# Patient Record
Sex: MALE | Race: WHITE
[De-identification: names, ages, dates, MRNs, and addresses within clinical notes are randomized per-mention and may not be internally consistent; named-entity substitution may affect disease eponyms.]

---

## 2017-09-29 ENCOUNTER — HOSPITAL ENCOUNTER (OUTPATIENT)
Dept: HOSPITAL 92 - ERS | Age: 72
Setting detail: OBSERVATION
LOS: 2 days | Discharge: HOME | End: 2017-10-01
Attending: INTERNAL MEDICINE | Admitting: INTERNAL MEDICINE
Payer: MEDICARE

## 2017-09-29 VITALS — BODY MASS INDEX: 37.5 KG/M2

## 2017-09-29 DIAGNOSIS — I25.10: ICD-10-CM

## 2017-09-29 DIAGNOSIS — Z90.49: ICD-10-CM

## 2017-09-29 DIAGNOSIS — E66.01: ICD-10-CM

## 2017-09-29 DIAGNOSIS — Z79.01: ICD-10-CM

## 2017-09-29 DIAGNOSIS — D50.0: ICD-10-CM

## 2017-09-29 DIAGNOSIS — I48.2: ICD-10-CM

## 2017-09-29 DIAGNOSIS — Z82.3: ICD-10-CM

## 2017-09-29 DIAGNOSIS — Z95.0: ICD-10-CM

## 2017-09-29 DIAGNOSIS — J45.909: ICD-10-CM

## 2017-09-29 DIAGNOSIS — Z87.891: ICD-10-CM

## 2017-09-29 DIAGNOSIS — N18.3: ICD-10-CM

## 2017-09-29 DIAGNOSIS — K63.3: ICD-10-CM

## 2017-09-29 DIAGNOSIS — Z88.0: ICD-10-CM

## 2017-09-29 DIAGNOSIS — J44.9: ICD-10-CM

## 2017-09-29 DIAGNOSIS — E78.5: ICD-10-CM

## 2017-09-29 DIAGNOSIS — Z79.899: ICD-10-CM

## 2017-09-29 DIAGNOSIS — I12.9: ICD-10-CM

## 2017-09-29 DIAGNOSIS — E11.22: ICD-10-CM

## 2017-09-29 DIAGNOSIS — M10.9: ICD-10-CM

## 2017-09-29 DIAGNOSIS — K92.2: Primary | ICD-10-CM

## 2017-09-29 DIAGNOSIS — Z80.8: ICD-10-CM

## 2017-09-29 LAB
ALP SERPL-CCNC: 29 U/L (ref 40–150)
ALT SERPL W P-5'-P-CCNC: 19 U/L (ref 8–55)
ANION GAP SERPL CALC-SCNC: 12 MMOL/L (ref 10–20)
APTT PPP: 37.8 SEC (ref 22.9–36.1)
AST SERPL-CCNC: 18 U/L (ref 5–34)
BASOPHILS # BLD AUTO: 0 THOU/UL (ref 0–0.2)
BASOPHILS NFR BLD AUTO: 0.5 % (ref 0–1)
BILIRUB SERPL-MCNC: 0.4 MG/DL (ref 0.2–1.2)
BUN SERPL-MCNC: 37 MG/DL (ref 8.4–25.7)
CALCIUM SERPL-MCNC: 9.3 MG/DL (ref 7.8–10.44)
CHLORIDE SERPL-SCNC: 104 MMOL/L (ref 98–107)
CO2 SERPL-SCNC: 32 MMOL/L (ref 23–31)
CREAT CL PREDICTED SERPL C-G-VRATE: 0 ML/MIN (ref 70–130)
EOSINOPHIL # BLD AUTO: 0.3 THOU/UL (ref 0–0.7)
EOSINOPHIL NFR BLD AUTO: 4.4 % (ref 0–10)
GLOBULIN SER CALC-MCNC: 2.9 G/DL (ref 2.4–3.5)
HCT VFR BLD CALC: 36.4 % (ref 42–52)
HCT VFR BLD CALC: 41.9 % (ref 42–52)
LYMPHOCYTES # BLD: 1.2 THOU/UL (ref 1.2–3.4)
LYMPHOCYTES NFR BLD AUTO: 15.5 % (ref 21–51)
MONOCYTES # BLD AUTO: 0.5 THOU/UL (ref 0.11–0.59)
MONOCYTES NFR BLD AUTO: 6.1 % (ref 0–10)
NEUTROPHILS # BLD AUTO: 5.6 THOU/UL (ref 1.4–6.5)
PROTHROMBIN TIME: 22 SEC (ref 12–14.7)
RBC # BLD AUTO: 4.52 MILL/UL (ref 4.7–6.1)
WBC # BLD AUTO: 7.6 THOU/UL (ref 4.8–10.8)

## 2017-09-29 PROCEDURE — 36430 TRANSFUSION BLD/BLD COMPNT: CPT

## 2017-09-29 PROCEDURE — 82962 GLUCOSE BLOOD TEST: CPT

## 2017-09-29 PROCEDURE — P9059 PLASMA, FRZ BETWEEN 8-24HOUR: HCPCS

## 2017-09-29 PROCEDURE — 85610 PROTHROMBIN TIME: CPT

## 2017-09-29 PROCEDURE — 36415 COLL VENOUS BLD VENIPUNCTURE: CPT

## 2017-09-29 PROCEDURE — 85049 AUTOMATED PLATELET COUNT: CPT

## 2017-09-29 PROCEDURE — G0378 HOSPITAL OBSERVATION PER HR: HCPCS

## 2017-09-29 PROCEDURE — 80053 COMPREHEN METABOLIC PANEL: CPT

## 2017-09-29 PROCEDURE — 86900 BLOOD TYPING SEROLOGIC ABO: CPT

## 2017-09-29 PROCEDURE — 80048 BASIC METABOLIC PNL TOTAL CA: CPT

## 2017-09-29 PROCEDURE — 85018 HEMOGLOBIN: CPT

## 2017-09-29 PROCEDURE — 85025 COMPLETE CBC W/AUTO DIFF WBC: CPT

## 2017-09-29 PROCEDURE — 99285 EMERGENCY DEPT VISIT HI MDM: CPT

## 2017-09-29 PROCEDURE — 85014 HEMATOCRIT: CPT

## 2017-09-29 PROCEDURE — 86901 BLOOD TYPING SEROLOGIC RH(D): CPT

## 2017-09-29 PROCEDURE — 86850 RBC ANTIBODY SCREEN: CPT

## 2017-09-29 PROCEDURE — 96360 HYDRATION IV INFUSION INIT: CPT

## 2017-09-29 PROCEDURE — 85730 THROMBOPLASTIN TIME PARTIAL: CPT

## 2017-09-29 PROCEDURE — 45382 COLONOSCOPY W/CONTROL BLEED: CPT

## 2017-09-29 PROCEDURE — A4216 STERILE WATER/SALINE, 10 ML: HCPCS

## 2017-09-29 PROCEDURE — 94640 AIRWAY INHALATION TREATMENT: CPT

## 2017-09-29 PROCEDURE — 36416 COLLJ CAPILLARY BLOOD SPEC: CPT

## 2017-09-29 PROCEDURE — 0W3P8ZZ CONTROL BLEEDING IN GASTROINTESTINAL TRACT, VIA NATURAL OR ARTIFICIAL OPENING ENDOSCOPIC: ICD-10-PCS

## 2017-09-29 RX ADMIN — Medication SCH ML: at 20:03

## 2017-09-29 RX ADMIN — MOMETASONE FUROATE AND FORMOTEROL FUMARATE DIHYDRATE SCH PUFF: 200; 5 AEROSOL RESPIRATORY (INHALATION) at 19:03

## 2017-09-29 NOTE — XMS
Clinical Summary

 Created on:2017



Patient:Sebastian Major

Sex:Male

:1945

External Reference #:[id number]





Demographics







 Address  [facility] [address]



   [place] [address]

 

 Home Phone  [phone]

 

 Email Address  [email or web address]

 

 Preferred Language  English

 

 Marital Status  

 

 Alevism Affiliation  Unknown

 

 Race  White

 

 Ethnic Group  Not  or 









Author







 Organization  [facility]

 

 Address  [address] [place] [address]

 

 Phone  [phone]









Care Team Providers







 Name  Role  Phone

 

 ,  Primary Care Provider  Unavailable









Allergies

Not on File



Current Medications

Not on file



Active Problems

Not on file



Social History







 Tobacco Use  Types  Packs/Day  Years Used  Date

 

 Never Assessed        









 Sex Assigned at Birth  Date Recorded

 

 Not on file  







Last Filed Vital Signs

Not on file



Plan of Treatment

Not on file



Results

Not on filefrom Last 3 Months

## 2017-09-29 NOTE — CON
DATE OF CONSULTATION:  09/29/2017

 

HISTORY OF PRESENT ILLNESS:  Patient is a 72-year-old  male who underwent a colonoscopy on 
09/13/2017, in which multiple polyps were found.  Histopathology from the procedure showed biopsies 
of the antrum and body of stomach showing no Helicobacter pylori with some mild chronic inactive gas
tritis.  Ascending colon polyps showed 4 tubular adenomas.  A transverse colon polyp showed a tubulo
villous adenoma.  Rectal polyps showed fragments of tubular adenoma and 2 fragments a hyperplastic p
olyp.  He was doing well.  He resumed his Coumadin approximately 3 days after the procedure and was 
doing well up until today when he woke up he had 5 large bloody bowel movements.  He denies any abdo
denny pain, nausea, vomiting or recent other GI complaints.

 

PAST MEDICAL HISTORY:  Includes atrial fibrillation on Coumadin, chronic obstructive pulmonary disea
se, diabetes mellitus, hypertension, coronary artery disease, gout and hyperlipidemia.

 

PAST SURGICAL HISTORY:  Includes appendectomy, tonsillectomy, pacemaker placement and cataract surge
ry.

 

ALLERGIES:  PENICILLIN.

 

SOCIAL HISTORY:  He is a former smoker and does not drink alcohol.

 

HOME MEDICATIONS:  Include glipizide 2.5 mg 1 p.o. q. day, multivitamin 1 p.o. q. day, Levoxyl 100 m
cg p.o. q. day, Lantus insulin 12 units subq at bedtime, Triglide 160 mg p.o. q. day, Symbicort 2 pu
ffs b.i.d., warfarin 2 mg on 4 days a week and 1 mg on 3 days a week, Lasix 40 mg p.o. q. day, amiod
arone 100 mg p.o. q. day, Neurontin 100 mg p.o. b.i.d., Coreg 12.5 mg p.o. b.i.d., lovastatin 20 mg 
p.o. q.p.m., Loratadine 10 mg p.o. q. day, aspirin 81 mg p.o. q. day, citalopram 10 mg p.o. daily an
d allopurinol 100 mg p.o. q. day.

 

REVIEW OF SYSTEMS:  Constitutional:  No fever or chills.  No weight loss.  Eyes:  No blurred vision 
or double vision.  ENT:  No sore throat or earaches.  Cardiovascular:  No chest pain or palpitations
.  Pulmonary:  No shortness of breath, cough or wheezing.  Gastrointestinal:  See above.  Genitourin
jadyn:  No hematuria or dysuria.  Musculoskeletal:  No joint pain or muscle weakness.  Skin:  No rashe
s.

 

LABORATORY DATA:  Shows a hemoglobin of 13.6 and hematocrit of 41.2.  PT is 22.0 with an INR of 1.9.
  Chemistries showed a BUN of 37, creatinine of 1.88 and glucose of 159.

 

ASSESSMENT:

1.  Gastrointestinal bleed - It is a little unusual for a post-polypectomy hemorrhage to occur more 
than 2 weeks post-polypectomy; however, the patient is on Coumadin and this may have caused the blee
ding what may have smaller ulcer.

2.  Atrial fibrillation, on Coumadin.

3.  Anemia secondary to gastrointestinal blood loss.

4.  Diabetes mellitus.

 

RECOMMENDATIONS:

1.  Colonoscopy in a.m.

2.  Vitamin K to correct coagulopathy.

## 2017-09-29 NOTE — HP
PRIMARY CARE PHYSICIAN:  Rafaela Zuñiga M.D.

 

REASON FOR ADMISSION:  Lower GI bleed.

 

HISTORY OF PRESENT ILLNESS:  A 72-year-old male who has underlying history of 
chronic atrial fibrillation on chronic anticoagulation therapy with warfarin, 
who came to emergency room for bright red blood per rectum which was started 
this morning around 6 a.m.  The patient reports that it was pretty much large 
amount of blood in his toilet when he had restroom this morning.  He did not 
have any pain.  He did not have any fever.  He does not have any antibiotic 
exposure.  He waited for another hour and he had another large bowel movement 
which was only bright red blood per rectum.  He was concerned about it and 
decided to come to the emergency room for evaluation.  After coming to the 
emergency room, he had 2 episodes of rectal bleed.  Patient reports that on , patient had colonoscopy, it was done by Dr. Menon.  At the same time, 
the patient also had upper endoscopy.  Patient was kept off on warfarin therapy 
5 days prior to procedure and 3 days after procedure, he started back on 
warfarin on 2017 and subsequently patient also had a couple of days ago PT
/INR checked which was 1.9.

 

Patient reports that with last colonoscopy, they removed several polyps, one 
was which pretty much large, but all turned out to be negative for malignancy.  
This patient denies any abdominal pain.  He denies any chest pain, palpitation, 
dizziness or shortness of breath.  Today in the emergency room, his hemoglobin 
is 13.6, his INR is 1.9, and his creatinine 1.88 which are pretty much stable.

 

At this point, we are admitting this patient in the hospital for further 
evaluation.

 

REVIEW OF SYSTEMS:  The following complete review of systems was negative, 
unless otherwise mentioned in the HPI or below:

Constitutional:  Weight loss or gain, ability to conduct usual activities.

Skin:  Rash, itching.

Eyes:  Double vision, pain.

ENT/Mouth:  Nose bleeding, neck stiffness, pain, tenderness.

Cardiovascular:  Palpitations, dyspnea on exertion, orthopnea.

Respiratory:  Shortness of breath, wheezing, cough, hemoptysis, fever or night 
sweats.

Gastrointestinal:  Poor appetite, abdominal pain, heartburn, nausea, vomiting, 
constipation, or diarrhea.

Genitourinary:  Urgency, frequency, dysuria, nocturia.

Musculoskeletal:  Pain, swelling.

Neurologic/Psychiatric:  Anxiety, depression.

Allergy/Immunologic:  Skin rash, bleeding tendency.

 

Please see my HPI for pertinent positive and negative.  All other review of 
systems reviewed and negative except as mentioned in the HPI.

 

PAST MEDICAL HISTORY:  Chronic atrial fibrillation, COPD, morbid obesity, 
diabetes type 2, hypertension, coronary artery disease, gout, dyslipidemia.

 

PAST SURGICAL HISTORY:  Appendicectomy, tonsillectomy, pacemaker placed in 2013
, cataract surgery.  I\T\D was performed for abscess in the neck.

 

PAST PSYCHIATRIC HISTORY:  Reviewed and negative.

 

ALLERGIES:  The patient is allergic to PENICILLIN.

 

FAMILY HISTORY:  Both parents .  Mom  of massive stroke by age of 
89.  Father had history of bone cancer.  Father had exposure to benzene and he 
 by age of 56.

 

SOCIAL HISTORY:  Patient is .  He quit smoking in .  He denies any 
alcohol or other illicit drug abuse.

 

CURRENT HOME MEDICATIONS:  Allopurinol 300 mg p.o. daily, amiodarone 100 mg 
p.o. daily, aspirin 81 mg p.o. daily, Symbicort 2 puffs inhalation b.i.d., 
Coreg 25 mg p.o. b.i.d., Celexa 10 mg p.o. daily, Lasix 40 mg p.o. daily, 
Neurontin 100 mg p.o. at bedtime, Amaryl 1 mg p.o. b.i.d., glucosamine 
chondroitin sulfate 2 tablets p.o. daily, Claritin 10 mg p.o. daily, losartan 
with hydrochlorothiazide 100/25 one tablet p.o. daily, lovastatin 20 mg p.o. 
daily, multivitamin 1 capsule p.o. daily, warfarin 2 mg daily, Ambien 5 mg p.o. 
at bedtime, metformin 1000 mg p.o. b.i.d.

 

EMERGENCY ROOM COURSE:  Patient has received 1 liter of IV fluid.

 

PHYSICAL EXAMINATION:

VITAL SIGNS:  On arrival, blood pressure 131/67, pulse 60, respiratory rate 14, 
temperature 97.5, saturation 89% on room air, weight not measured yet.

GENERAL:  Patient is currently alert, awake, no obvious acute distress.

HEAD:  Normocephalic, atraumatic.

EYES:  Pupils round, reactive to light.  Extraocular muscles intact.

ENT:  Oropharynx within normal limits.  Moist mucous membranes.  No oral 
lesions.  No pharyngeal erythema, no exudate.

NECK:  Supple.  Range of motion is normal.  No meningeal signs of irritation.

LUNGS:  Clear to auscultation without any rhonchi or rales.

CARDIAC:  S1, S2 regular.  No murmur, no gallop, no rub.

ABDOMEN:  Soft, obesity present.  Bowel sounds present, nontender, 
nondistended.  No organomegaly, no mass, no suprapubic tenderness.  No 
peritoneal signs.  No rebound.

BACK:  Examination unremarkable, no CVA tenderness.

EXTREMITIES:  Upper extremity passive movements of all joints are normal.  
Lower extremity, no edema.  Good peripheral pulsation.

SKIN:  No skin rash.

HEMATOLOGICAL SYSTEM:  No lymphadenopathy.

PSYCHIATRIC:  Normal affect.

NEUROLOGIC:  The patient is alert, oriented x3.  Cranial nerves II-XII intact.  
Motor and sensation within normal limits.  Speech normal.  No focal 
neurological deficit noted.

 

SIGNIFICANT LABORATORY DATA AND IMAGIN.  CBC:  WBC 7.6, hemoglobin 13.6, platelet 247.  INR 1.9.

2.  BMP:  Sodium 143, potassium 4.8, chloride 104, carbon dioxide 32, anion gap 
12, BUN 37, creatinine 1.88, glucose 159, calcium 9.3.

3.  LFT:  AST 18, ALT 19, alkaline phosphatase 29, albumin 3.7.

4.  Telemetry monitor showing currently sinus rhythm.

 

ASSESSMENT AND PLAN/IMPRESSION:

1.  Lower gastrointestinal bleed.  This patient has bright red blood per 
rectum.  This patient already had a colonoscopy done recently and several 
polyps were removed and his polys pathology report is tubulovillous adenoma.  
One polyp was hyperplastic without any malignancy.  At this point, the patient 
has painless lower gastrointestinal bleed and most likely it is diverticular 
bleed versus polyp bleed.  At this point, the patient is on anticoagulation 
therapy with warfarin and that is why we will hold on aspirin and warfarin 
therapy.  We will keep him on clear liquid diet and we will consult 
gastroenterologist for further evaluation.  Today, we will check H\T\H every 4 
hourly x3 and we will repeat CBC tomorrow.  If he is getting symptomatic or if 
his hemoglobin drops below 8, then we will consider transfusion.

2.  Anemia due to acute blood loss.  Currently, hemoglobin is 13.6, but we are 
expecting that if his hemoglobin may drop to little bit and if hemoglobin is 
less than 8, then we will consider blood transfusion.  We will monitor H\T\H 
here in the hospital today and tomorrow.

3.  Paroxysmal atrial fibrillation, chronic, with currently sinus rhythm.  The 
patient is on chronic anticoagulation therapy.  His INR is 1.9.  As mentioned 
above, patient has active bleeding and that is why we will hold on warfarin 
therapy.  Patient will have his home medication for atrial fibrillation with 
amiodarone 100 mg p.o. daily.

4.  Diabetes type 2.  We will continue with insulin as per sliding scale 
protocol.  Diabetic diet will be given when patient able to take p.o. intake.  
At this point, currently on a clear liquid diet.  We will also resume patient's 
home medications.

5.  Hypertension.  Currently, the patient's blood pressure is reasonable.  We 
will continue with the patient's blood pressure medication while in hospital.

6.  Chronic obstructive pulmonary disease.  DuoNeb therapy every 6 hourly 
p.r.n. and Dulera 2 puffs inhalation b.i.d.

7.  Mild hypoxia on admission.  We will monitor oxygen saturation while in 
hospital periodically to assess any need of home oxygen therapy.

8.  Dyslipidemia.  We will resume patient's home medication while in hospital.

9.  Obesity.  Dietary education given, weight loss education given.  Healthy 
lifestyle measures discussed with the patient.

10.  Chronic kidney disease stage 3.  Looking at his old record, patient's 
renal function is stable enough to his baseline.  We will avoid nephrotoxic 
agent.

11.  Coronary artery disease.  Patient will have beta blocker and statin 
therapy.  We will hold on aspirin therapy because of bleeding.

12.  Deep venous thrombosis prophylaxis.  Sequential compression device boots.  
No warfarin therapy because of bleeding.

13.  Gastrointestinal prophylaxis.  Patient will have Protonix 40 mg IV daily.

14.  Code status:  The patient is FULL CODE.  The patient's wife is surrogate 
decision maker.

 

Disposition plan based on clinical course.  We are expecting patient's stay in 
hospital 1-2 midnights.  Plan of care discussed with the patient's family 
member.

 

ADDITIONAL INFORMATION:  This patient, if he has spontaneous stopping of bright 
red blood per rectum, then we will advance his diet till tomorrow and if he 
does not have any further bleeding in next 24-48 hours, then we will consider 
discharging him home.  If this patient has ongoing rectal bleeding, then we 
will consider doing a nuclear medicine RBC tagged scan and GI may decide about 
doing colonoscopy or colonoscopy if needed.

 

Plan of care discussed with the patient's family member at bedside in the 
emergency room.

 

CRESCENCIO

## 2017-09-30 LAB
ANION GAP SERPL CALC-SCNC: 12 MMOL/L (ref 10–20)
BASOPHILS # BLD AUTO: 0 THOU/UL (ref 0–0.2)
BASOPHILS NFR BLD AUTO: 0.3 % (ref 0–1)
BUN SERPL-MCNC: 39 MG/DL (ref 8.4–25.7)
CALCIUM SERPL-MCNC: 8.1 MG/DL (ref 7.8–10.44)
CHLORIDE SERPL-SCNC: 106 MMOL/L (ref 98–107)
CO2 SERPL-SCNC: 27 MMOL/L (ref 23–31)
CREAT CL PREDICTED SERPL C-G-VRATE: 54 ML/MIN (ref 70–130)
EOSINOPHIL # BLD AUTO: 0.2 THOU/UL (ref 0–0.7)
EOSINOPHIL NFR BLD AUTO: 2.5 % (ref 0–10)
HCT VFR BLD CALC: 28.6 % (ref 42–52)
HCT VFR BLD CALC: 29.3 % (ref 42–52)
HCT VFR BLD CALC: 34.5 % (ref 42–52)
LYMPHOCYTES # BLD: 1.3 THOU/UL (ref 1.2–3.4)
LYMPHOCYTES NFR BLD AUTO: 14.9 % (ref 21–51)
MONOCYTES # BLD AUTO: 0.6 THOU/UL (ref 0.11–0.59)
MONOCYTES NFR BLD AUTO: 6.6 % (ref 0–10)
NEUTROPHILS # BLD AUTO: 6.4 THOU/UL (ref 1.4–6.5)
PROTHROMBIN TIME: 21.6 SEC (ref 12–14.7)
RBC # BLD AUTO: 3.17 MILL/UL (ref 4.7–6.1)
WBC # BLD AUTO: 8.5 THOU/UL (ref 4.8–10.8)

## 2017-09-30 RX ADMIN — Medication SCH: at 21:33

## 2017-09-30 RX ADMIN — MOMETASONE FUROATE AND FORMOTEROL FUMARATE DIHYDRATE SCH PUFF: 200; 5 AEROSOL RESPIRATORY (INHALATION) at 08:00

## 2017-09-30 RX ADMIN — MOMETASONE FUROATE AND FORMOTEROL FUMARATE DIHYDRATE SCH PUFF: 200; 5 AEROSOL RESPIRATORY (INHALATION) at 19:31

## 2017-09-30 RX ADMIN — Medication SCH: at 07:40

## 2017-09-30 NOTE — PDOC.PN
- Subjective


Encounter Start Date: 09/30/17


Encounter Start Time: 08:10


-: old records requested/rev





this morning pt still had BRBPR, pt feels weak, no chest pain, family bedside





- Objective


Resuscitation Status: 


 











Resuscitation Status           FULL:Full Resuscitation














MAR Reviewed: Yes


Vital Signs & Weight: 


 Vital Signs (12 hours)











  Temp Pulse Resp BP Pulse Ox


 


 09/30/17 08:05      93 L


 


 09/30/17 08:00   71  28 H   93 L


 


 09/30/17 07:23  98.1 F  85  22 H  


 


 09/30/17 07:11  98.1 F  85  22 H  138/58 L  92 L


 


 09/30/17 03:25  98.6 F  78  22 H  139/60  94 L


 


 09/29/17 23:16  98.1 F  60  18  135/92 H  94 L








 Weight











Weight                         269 lb 3.2 oz














I&O: 


 











 09/29/17 09/30/17 10/01/17





 06:59 06:59 06:59


 


Intake Total  4700 


 


Balance  4700 











Result Diagrams: 


 09/30/17 08:11





 09/30/17 05:04


Additional Labs: 


 Accuchecks











  09/29/17 09/29/17 09/29/17





  20:41 16:31 12:54


 


POC Glucose  101  100  114 H














Phys Exam





- Physical Examination


Constitutional: NAD


HEENT: PERRLA, moist MMs, sclera anicteric


Neck: no JVD, supple


Respiratory: no wheezing, no rales, no rhonchi


Cardiovascular: RRR, no significant murmur, no rub


Gastrointestinal: soft, non-tender, no distention, positive bowel sounds


Musculoskeletal: no edema, pulses present


Neurological: non-focal, normal sensation, moves all 4 limbs


Lymphatic: no nodes


Psychiatric: normal affect, A&O x 3


Skin: no rash, normal turgor





Dx/Plan


(1) Anemia due to acute blood loss


Code(s): D62 - ACUTE POSTHEMORRHAGIC ANEMIA   Status: Acute   





(2) Lower GI bleed


Code(s): K92.2 - GASTROINTESTINAL HEMORRHAGE, UNSPECIFIED   Status: Acute   





(3) Asthma


Code(s): J45.909 - UNSPECIFIED ASTHMA, UNCOMPLICATED   Status: Chronic   





(4) Atrial fibrillation


Code(s): I48.91 - UNSPECIFIED ATRIAL FIBRILLATION   Status: Chronic   





(5) CKD (chronic kidney disease) stage 3, GFR 30-59 ml/min


Code(s): N18.3 - CHRONIC KIDNEY DISEASE, STAGE 3 (MODERATE)   Status: Chronic   





(6) Depression


Code(s): F32.9 - MAJOR DEPRESSIVE DISORDER, SINGLE EPISODE, UNSPECIFIED   Status

: Chronic   





(7) Dyslipidemia


Code(s): E78.5 - HYPERLIPIDEMIA, UNSPECIFIED   Status: Chronic   





(8) Gout


Code(s): M10.9 - GOUT, UNSPECIFIED   Status: Chronic   





(9) Hypertension


Code(s): I10 - ESSENTIAL (PRIMARY) HYPERTENSION   Status: Chronic   





(10) Obesity (BMI 30-39.9)


Code(s): E66.9 - OBESITY, UNSPECIFIED   Status: Chronic   





- Plan


cont current plan of care, plan discussed w/ family





* today plan for colonoscopy


* will give FFP to reverse warfarin effect


* medication reviewed as below


* symptomatic treatment


* will monitor today and repeat CBC, BMP tomorrow.








Review of Systems





- Review of Systems


Constitutional: Weakness.  negative: Fever, Chills, Sweats, Malaise, Other


Eyes: negative: Pain, Vision Change, Conjunctivae Inflammation, Eyelid 

Inflammation, Redness, Other


ENT: negative: Ear Pain, Ear Discharge, Nose Pain, Nose Discharge, Nose 

Congestion, Mouth Pain, Mouth Swelling, Throat Pain, Throat Swelling, Other


Respiratory: negative: Cough, Dry, Shortness of Breath, Hemoptysis, SOB with 

Excertion, Pleuritic Pain, Sputum, Wheezing


Cardiovascular: negative: Chest Pain, Palpitations, Orthopnea, Paroxysmal Noc. 

Dyspnea, Edema, Light Headedness, Other


Gastrointestinal: Hematochezia.  negative: Nausea, Vomiting, Abdominal Pain, 

Diarrhea, Constipation, Melena, Other


Genitourinary: negative: Dysuria, Frequency, Incontinence, Hematuria, Retention

, Other


Musculoskeletal: negative: Neck Pain, Shoulder Pain, Arm Pain, Back Pain, Hand 

Pain, Leg Pain, Foot Pain, Other


Skin: negative: Rash, Lesions, [place], Bruising, Other





- Medications/Allergies


Allergies/Adverse Reactions: 


 Allergies











Allergy/AdvReac Type Severity Reaction Status Date / Time


 


Penicillins Allergy   Verified 09/29/17 12:48











Medications: 


 Current Medications





Acetaminophen (Tylenol)  650 mg PO Q4H PRN


   PRN Reason: Headache/Fever or Pain


Hydrocodone Bitart/Acetaminophen (Norco 5/325)  1 tab PO Q4H PRN


   PRN Reason: Moderate Pain (4-6)


Al Hydroxide/Mg Hydroxide (Maalox)  30 ml PO Q6H PRN


   PRN Reason: Heartburn  or Indigestion


Albuterol/Ipratropium (Duoneb)  3 ml NEB [id number] PRN


   PRN Reason: SOB &/or Wheezing


Dextrose/Water (Dextrose 50%)  25 gm SLOW IVP PRN PRN


   PRN Reason: Hypoglycemia


Glucagon (Glucagon)  1 mg IM PRN PRN


   PRN Reason: Hypoglycemia


Guaifenesin (Robitussin Sf)  200 mg PO Q4H PRN


   PRN Reason: Cough


Hydralazine HCl (Apresoline)  10 mg SLOW IVP Q4H PRN


   PRN Reason: Systolic BP > 180


Dextrose/Water (D5w)  1,000 mls @ 0 mls/hr IV .Q0M PRN; As Directed


   PRN Reason: Hypoglycemia


Sodium Chloride (Normal Saline 0.9%)  1,000 mls @ 50 mls/hr IV .Q20H [facility]


   Last Admin: 09/30/17 03:42 Dose:  1,000 mls


Insulin Human Lispro (Humalog)  0 units SC .MODERATE SLIDING SC PRN


   PRN Reason: Moderate Correctional Scale


Insulin Human Lispro (Humalog)  0 units SC .BEDTIME SLIDING SC PRN


   PRN Reason: Bedtime Correctional Scale


Loperamide HCl (Imodium)  2 mg PO PRN PRN


   PRN Reason: Diarrhea/Loose Stools


Loratadine (Claritin)  10 mg PO DAILYPRN PRN


   PRN Reason: Sinus Symptoms


Magnesium Hydroxide (Milk Of Magnesium)  30 ml PO DAILYPRN PRN


   PRN Reason: Constipation


Mineral Oil/White Petrolatum (Eucerin Cream)  0 gm TOP BIDPRN PRN


   PRN Reason: Dry Skin


Mometasone Furoate/Formoterol Fumar (Dulera 200 Mcg/5 Mcg Inhaler)  2 puff INH 

BID-RT [facility]


   Last Admin: 09/30/17 08:00 Dose:  2 puff


Nitroglycerin (Nitrostat)  0.4 mg SL Q5MIN PRN


   PRN Reason: Chest Pain


Ondansetron HCl (Zofran Odt)  4 mg PO Q6H PRN


   PRN Reason: Nausea/Vomiting


Ondansetron HCl (Zofran)  4 mg IVP Q6H PRN


   PRN Reason: Nausea/Vomiting


Senna (Senokot)  2 tab PO HSPRN PRN


   PRN Reason: Constipation


Sodium Chloride (Ocean Nasal Spray 0.65%)  0 ml EA NARE QIDPRN PRN


   PRN Reason: Nasal Congestion


Sodium Chloride (Flush - Normal Saline)  10 ml IVF Q12HR [facility]


   Last Admin: 09/30/17 07:40 Dose:  Not Given


Sodium Chloride (Flush - Normal Saline)  10 ml IVF PRN PRN


   PRN Reason: Saline Flush


   Last Admin: 09/30/17 03:42 Dose:  10 ml


Zolpidem Tartrate (Ambien)  5 mg PO HSPRN PRN


   PRN Reason: Insomnia

## 2017-09-30 NOTE — OP
PREOPERATIVE DIAGNOSIS:  Gastrointestinal bleed.

 

DESCRIPTION OF THE PROCEDURE:  After informed consent was obtained, the patient placed in the left l
ateral decubitus position.  Anesthesia was administered per the Anesthesia Department.  Forward-view
ing endoscope was inserted into the rectum after perianal inspection and rectal exam were normal.  T
he prep was suboptimal secondary to small blood, but most of this was washed free.  In the transvers
e colon, a visible vessel was noted and this was ablated with a 10-[place] BICAP electrocautery.  A s
matthew clip was attempted to be placed, but was unsuccessful.  The remainder of the colon was normal.

 

ASSESSMENT:

1.  Colon ulcer with visible vessel - source of the patient's bleeding.

2.  Status post control of hemorrhage.

 

RECOMMENDATIONS:

1.  Monitor H\T\H.

2.  Resume diet.

3.  Continue to hold Coumadin.

## 2017-10-01 VITALS — SYSTOLIC BLOOD PRESSURE: 156 MMHG | TEMPERATURE: 97.9 F | DIASTOLIC BLOOD PRESSURE: 63 MMHG

## 2017-10-01 LAB
ANION GAP SERPL CALC-SCNC: 9 MMOL/L (ref 10–20)
BASOPHILS # BLD AUTO: 0 THOU/UL (ref 0–0.2)
BASOPHILS NFR BLD AUTO: 0.3 % (ref 0–1)
BUN SERPL-MCNC: 30 MG/DL (ref 8.4–25.7)
CALCIUM SERPL-MCNC: 7.9 MG/DL (ref 7.8–10.44)
CHLORIDE SERPL-SCNC: 108 MMOL/L (ref 98–107)
CO2 SERPL-SCNC: 28 MMOL/L (ref 23–31)
CREAT CL PREDICTED SERPL C-G-VRATE: 60 ML/MIN (ref 70–130)
EOSINOPHIL # BLD AUTO: 0.2 THOU/UL (ref 0–0.7)
EOSINOPHIL NFR BLD AUTO: 3.6 % (ref 0–10)
HCT VFR BLD CALC: 22.3 % (ref 42–52)
LYMPHOCYTES # BLD: 1.3 THOU/UL (ref 1.2–3.4)
LYMPHOCYTES NFR BLD AUTO: 19.8 % (ref 21–51)
MONOCYTES # BLD AUTO: 0.5 THOU/UL (ref 0.11–0.59)
MONOCYTES NFR BLD AUTO: 6.9 % (ref 0–10)
NEUTROPHILS # BLD AUTO: 4.6 THOU/UL (ref 1.4–6.5)
PROTHROMBIN TIME: 15.6 SEC (ref 12–14.7)
RBC # BLD AUTO: 2.43 MILL/UL (ref 4.7–6.1)
WBC # BLD AUTO: 6.6 THOU/UL (ref 4.8–10.8)

## 2017-10-01 RX ADMIN — MOMETASONE FUROATE AND FORMOTEROL FUMARATE DIHYDRATE SCH PUFF: 200; 5 AEROSOL RESPIRATORY (INHALATION) at 08:19

## 2017-10-01 RX ADMIN — Medication SCH: at 08:28

## 2017-10-01 NOTE — PDOC.PN
- Subjective


Encounter Start Date: 10/01/17


Encounter Start Time: 07:10





Patient seen and examined. No new complaints. No overnight events





- Objective


Resuscitation Status: 


 











Resuscitation Status           FULL:Full Resuscitation














MAR Reviewed: Yes


Vital Signs & Weight: 


 Vital Signs (12 hours)











  Temp Pulse Resp BP BP Pulse Ox


 


 10/01/17 08:28  97.9 F  67  12   


 


 10/01/17 08:19   67  12   


 


 10/01/17 07:18  97.9 F  73  16   156/63 H  92 L


 


 10/01/17 03:41  98.5 F  72  16  172/64 H   96


 


 09/30/17 23:17  97.7 F  70  12   147/58 H  96








 Weight











Weight                         269 lb 3.2 oz














I&O: 


 











 09/30/17 10/01/17 10/02/17





 06:59 06:59 06:59


 


Intake Total 4700 1409 


 


Balance 4700 1409 











Result Diagrams: 


 10/01/17 04:45





 10/01/17 04:44


Additional Labs: 


 Accuchecks











  10/01/17 09/30/17 09/30/17





  06:13 21:01 16:54


 


POC Glucose  183 H  165 H  143 H














  09/30/17





  11:37


 


POC Glucose  138 H














Phys Exam





- Physical Examination


Constitutional: NAD


HEENT: PERRLA, moist MMs, sclera anicteric


Neck: no JVD, supple


Respiratory: no wheezing, no rales, no rhonchi


Cardiovascular: RRR, no significant murmur, no rub


Gastrointestinal: soft, non-tender, no distention, positive bowel sounds


Musculoskeletal: no edema, pulses present


Neurological: non-focal, normal sensation, moves all 4 limbs


Psychiatric: normal affect, A&O x 3


Skin: no rash, normal turgor





Dx/Plan


(1) Anemia due to acute blood loss


Code(s): D62 - ACUTE POSTHEMORRHAGIC ANEMIA   Status: Acute   





(2) Lower GI bleed


Code(s): K92.2 - GASTROINTESTINAL HEMORRHAGE, UNSPECIFIED   Status: Acute   





(3) Asthma


Code(s): J45.909 - UNSPECIFIED ASTHMA, UNCOMPLICATED   Status: Chronic   





(4) Atrial fibrillation


Code(s): I48.91 - UNSPECIFIED ATRIAL FIBRILLATION   Status: Chronic   





(5) CKD (chronic kidney disease) stage 3, GFR 30-59 ml/min


Code(s): N18.3 - CHRONIC KIDNEY DISEASE, STAGE 3 (MODERATE)   Status: Chronic   





(6) Depression


Code(s): F32.9 - MAJOR DEPRESSIVE DISORDER, SINGLE EPISODE, UNSPECIFIED   Status

: Chronic   





(7) Dyslipidemia


Code(s): E78.5 - HYPERLIPIDEMIA, UNSPECIFIED   Status: Chronic   





(8) Gout


Code(s): M10.9 - GOUT, UNSPECIFIED   Status: Chronic   





(9) Hypertension


Code(s): I10 - ESSENTIAL (PRIMARY) HYPERTENSION   Status: Chronic   





(10) Obesity (BMI 30-39.9)


Code(s): E66.9 - OBESITY, UNSPECIFIED   Status: Chronic   





- Plan


cont current plan of care





* iron on discharge


* no more bleeding per pt


* pt is not interested in transfusion


* hold warfarin until cleared by GI


* medication reviewed as below


* symptomatic treatment.


.








Review of Systems





- Review of Systems


ENT: negative: Ear Pain, Ear Discharge, Nose Pain, Nose Discharge, Nose 

Congestion, Mouth Pain, Mouth Swelling, Throat Pain, Throat Swelling, Other


Respiratory: negative: Cough, Dry, Shortness of Breath, Hemoptysis, SOB with 

Excertion, Pleuritic Pain, Sputum, Wheezing


Cardiovascular: negative: Chest Pain, Palpitations, Orthopnea, Paroxysmal Noc. 

Dyspnea, Edema, Light Headedness, Other


Gastrointestinal: negative: Nausea, Vomiting, Abdominal Pain, Diarrhea, 

Constipation, Melena, Hematochezia, Other


Genitourinary: negative: Dysuria, Frequency, Incontinence, Hematuria, Retention

, Other


Musculoskeletal: negative: Neck Pain, Shoulder Pain, Arm Pain, Back Pain, Hand 

Pain, Leg Pain, Foot Pain, Other





- Medications/Allergies


Allergies/Adverse Reactions: 


 Allergies











Allergy/AdvReac Type Severity Reaction Status Date / Time


 


Penicillins Allergy   Verified 09/29/17 12:48











Medications: 


 Current Medications





Acetaminophen (Tylenol)  650 mg PO Q4H PRN


   PRN Reason: Headache/Fever or Pain


Hydrocodone Bitart/Acetaminophen (Norco 5/325)  1 tab PO Q4H PRN


   PRN Reason: Moderate Pain (4-6)


Al Hydroxide/Mg Hydroxide (Maalox)  30 ml PO Q6H PRN


   PRN Reason: Heartburn  or Indigestion


Albuterol/Ipratropium (Duoneb)  3 ml NEB [id number] PRN


   PRN Reason: SOB &/or Wheezing


Dextrose/Water (Dextrose 50%)  25 gm SLOW IVP PRN PRN


   PRN Reason: Hypoglycemia


Glucagon (Glucagon)  1 mg IM PRN PRN


   PRN Reason: Hypoglycemia


Guaifenesin (Robitussin Sf)  200 mg PO Q4H PRN


   PRN Reason: Cough


Hydralazine HCl (Apresoline)  10 mg SLOW IVP Q4H PRN


   PRN Reason: Systolic BP > 180


Dextrose/Water (D5w)  1,000 mls @ 0 mls/hr IV .Q0M PRN; As Directed


   PRN Reason: Hypoglycemia


Sodium Chloride (Normal Saline 0.9%)  1,000 mls @ 50 mls/hr IV .Q20H [facility]


   Last Admin: 10/01/17 03:54 Dose:  1,000 mls


Insulin Human Lispro (Humalog)  0 units SC .MODERATE SLIDING SC PRN


   PRN Reason: Moderate Correctional Scale


   Last Admin: 10/01/17 06:27 Dose:  2 unit


Insulin Human Lispro (Humalog)  0 units SC .BEDTIME SLIDING SC PRN


   PRN Reason: Bedtime Correctional Scale


Loperamide HCl (Imodium)  2 mg PO PRN PRN


   PRN Reason: Diarrhea/Loose Stools


Loratadine (Claritin)  10 mg PO DAILYPRN PRN


   PRN Reason: Sinus Symptoms


Magnesium Hydroxide (Milk Of Magnesium)  30 ml PO DAILYPRN PRN


   PRN Reason: Constipation


Mineral Oil/White Petrolatum (Eucerin Cream)  0 gm TOP BIDPRN PRN


   PRN Reason: Dry Skin


Mometasone Furoate/Formoterol Fumar (Dulera 200 Mcg/5 Mcg Inhaler)  2 puff INH 

BID-RT [facility]


   Last Admin: 10/01/17 08:19 Dose:  2 puff


Nitroglycerin (Nitrostat)  0.4 mg SL Q5MIN PRN


   PRN Reason: Chest Pain


Ondansetron HCl (Zofran Odt)  4 mg PO Q6H PRN


   PRN Reason: Nausea/Vomiting


Ondansetron HCl (Zofran)  4 mg IVP Q6H PRN


   PRN Reason: Nausea/Vomiting


Senna (Senokot)  2 tab PO HSPRN PRN


   PRN Reason: Constipation


Sodium Chloride (Ocean Nasal Spray 0.65%)  0 ml EA NARE QIDPRN PRN


   PRN Reason: Nasal Congestion


Sodium Chloride (Flush - Normal Saline)  10 ml IVF Q12HR [facility]


   Last Admin: 10/01/17 08:28 Dose:  Not Given


Sodium Chloride (Flush - Normal Saline)  10 ml IVF PRN PRN


   PRN Reason: Saline Flush


   Last Admin: 09/30/17 03:42 Dose:  10 ml


Zolpidem Tartrate (Ambien)  5 mg PO HSPRN PRN


   PRN Reason: Insomnia

## 2017-10-01 NOTE — PRG
DATE OF SERVICE:  10/01/2017

 

SUBJECTIVE:  The patient is feeling good.  He has had no bowel movements since his colonoscopy.  He 
has not had any vomiting.  He is eating well.  He is ready to go home.

 

OBJECTIVE:

VITAL SIGNS:  Temperature 97.9, pulse 67, respiratory rate 12, and blood pressure 156/63.

CHEST:  Clear.

CARDIOVASCULAR:  Irregular rate and rhythm.

ABDOMEN:  Soft, nontender, without organomegaly or masses.

 

LABORATORY DATA:  Shows PT of 15.6 with an INR of 1.2, hemoglobin 7.4, and hematocrit 22.3.  
aureliano show creatinine 1.89, glucose 183.

 

ASSESSMENT:

1.  Post-polypectomy hemorrhage.

2.  Anemia secondary to gastrointestinal blood loss - decrease in hemoglobin and hematocrit this mor
jannie, probably reflects prior bleeding, does not appear that he is acutely bleeding.

 

RECOMMENDATIONS:

1.  Iron.

2.  Follow up with me in 4-5 days with a blood count before.

3.  Return if any symptoms recur.

## 2017-10-01 NOTE — DIS
DATE OF ADMISSION:  09/29/2017

 

DATE OF DISCHARGE:  10/01/2017

 

PRIMARY CARE PHYSICIAN:  Rafaela Zuñiga M.D.

 

DISCHARGE DISPOSITION:  Home.

 

PRIMARY DISCHARGE DIAGNOSES:

1.  Lower gastrointestinal bleed.

2.  Anemia due to acute blood loss.

3.  Colon ulcer with visible vessels, status post cauterization.

 

SECONDARY DISCHARGE DIAGNOSES:  Obesity with body mass index 37, hypertension, gout, dyslipidemia, a
sthma, paroxysmal atrial fibrillation, chronic kidney disease stage 3, depression, dyslipidemia.

 

PRIMARY PROCEDURE/OPERATION:  Colonoscopy found with colon ulcer and visible vessel which was cauter
ized by Dr. Menon.

 

RADIOLOGICAL INVESTIGATION:  None.

 

SIGNIFICANT LABORATORY DATA:  Hemoglobin 7.4.  INR 1.2, creatinine 1.89.

 

DISCHARGE MEDICATIONS:  Patient is advised to hold aspirin and warfarin until cleared by GI.  That c
an be restarted after 3-5 days.  Allopurinol 100 mg p.o. daily, amiodarone 100 mg p.o. daily, Symbic
ort 2 puffs inhalation b.i.d., Coreg 12.5 mg p.o. b.i.d., Celexa 10 mg p.o. daily, Tricor 160 mg p.o
. daily, ferrous sulfate 325 mg p.o. b.i.d., fish oil 2 capsules daily, Lasix 40 mg p.o. daily, adeel
pentin 100 mg b.i.d., glucosamine chondroitin sulfate 2 tablets p.o. daily, Lantus 12 units at bedti
me, ketoconazole topical application as directed, Synthroid 100 mcg p.o. daily, lovastatin 20 mg p.o
. at bedtime, multivitamin 1 tablet p.o. daily, and glipizide ER 2.5 mg p.o. daily.

 

CONTRAINDICATIONS:  None.

 

CODE STATUS:  FULL CODE.

 

INPATIENT CONSULTANT:  Dr. Menon was consulted while in hospital.

 

ALLERGIES:  PENICILLIN.

 

TEST RESULTS PENDING ON DISCHARGE:  None.

 

DISCHARGE PLAN:  Post hospital, the patient will follow with primary care physician and Dr. Menon as 
instructed.

 

HOSPITAL COURSE:  A 72-year-old male with above-mentioned medical problem who was admitted by me.  JANICE dao see my HPI for further details.  The patient was admitted as observation status on 09/29/2017.
  Patient came to the ER with lower GI bleed.  He was having bright red blood per rectum.  His initi
al hemoglobin was 13.6 and by the time of discharge, his hemoglobin was 7.4.  He has several times r
ectal bleeding.  During this admission, we treated him with IV fluid and Dr. Menon was consulted.  He
 did a colonoscopy.  This patient was found with colonic ulcer with visible vessel which was cauteri
zed and bleeding was stopped.

 

This patient had previously colonoscopy and polyp was removed and that is why he had ulcer and that 
was bleeding.  He had blood loss anemia, but he did not require any blood transfusion.  His hemoglob
in is 7.4 by the time of discharge.  We started iron supplementation.  On discharge, we advised him 
to hold warfarin and aspirin until cleared by GI.

 

The patient was not interested in blood transfusion.  Patient was also given IV fluids and his renal
 function was up to his baseline.  The patient is seen and examined at bedside today.  Please see my
 progress note from today for further details.

## 2018-08-23 ENCOUNTER — HOSPITAL ENCOUNTER (OUTPATIENT)
Dept: HOSPITAL 92 - DTY/OP | Age: 73
Discharge: HOME | End: 2018-08-23
Attending: FAMILY MEDICINE
Payer: MEDICARE

## 2018-08-23 DIAGNOSIS — E11.22: Primary | ICD-10-CM

## 2018-08-23 DIAGNOSIS — N18.9: ICD-10-CM

## 2018-08-23 DIAGNOSIS — Z71.89: ICD-10-CM

## 2018-08-23 PROCEDURE — 97802 MEDICAL NUTRITION INDIV IN: CPT

## 2019-09-12 ENCOUNTER — HOSPITAL ENCOUNTER (OUTPATIENT)
Dept: HOSPITAL 92 - RAD | Age: 74
Discharge: HOME | End: 2019-09-12
Attending: INTERNAL MEDICINE
Payer: MEDICARE

## 2019-09-12 DIAGNOSIS — J45.909: Primary | ICD-10-CM

## 2019-09-12 PROCEDURE — 71046 X-RAY EXAM CHEST 2 VIEWS: CPT

## 2019-09-12 NOTE — RAD
FRONTAL AND LATERAL IMAGING CHEST:

 

DATE: 9/12/2019.

 

COMPARISON: 

11/8/2017.

 

HISTORY: 

Shortness of breath.

 

FINDINGS: 

Stable dual-lead transvenous pacing device.  No pneumothorax or pleural fluid.  No focal consolidatio
n or alveolar edema.  There is mild increased linear density in the left base suggesting volume loss.


 

IMPRESSION: 

No focal consolidation or alveolar edema.

 

POS: [facility]

## 2020-11-06 ENCOUNTER — HOSPITAL ENCOUNTER (OUTPATIENT)
Dept: HOSPITAL 92 - BICRAD | Age: 75
Discharge: HOME | End: 2020-11-06
Attending: INTERNAL MEDICINE
Payer: MEDICARE

## 2020-11-06 DIAGNOSIS — J45.909: Primary | ICD-10-CM

## 2020-11-06 DIAGNOSIS — R91.8: ICD-10-CM

## 2020-11-06 PROCEDURE — 71046 X-RAY EXAM CHEST 2 VIEWS: CPT

## 2020-11-06 NOTE — RAD
PA AND LATERAL CHEST:

 

Comparison: 9-12-19

 

History: Dyspnea. Smoker.

 

FINDINGS: 

Heart size within normal limits. A pacemaker is present. Linear parenchymal changes in the lung bases
 are similar to the previous exam. Some blunting to the costophrenic angle also appears chronic. 

 

IMPRESSION: 

Chronic appearing pleural and parenchymal changes in the lung bases. 

 

POS: [facility]

## 2020-12-16 ENCOUNTER — HOSPITAL ENCOUNTER (OUTPATIENT)
Dept: HOSPITAL 92 - BICCT | Age: 75
Discharge: HOME | End: 2020-12-16
Attending: INTERNAL MEDICINE
Payer: MEDICARE

## 2020-12-16 DIAGNOSIS — N20.0: ICD-10-CM

## 2020-12-16 DIAGNOSIS — R63.0: Primary | ICD-10-CM

## 2020-12-16 DIAGNOSIS — K86.89: ICD-10-CM

## 2020-12-16 DIAGNOSIS — R91.8: ICD-10-CM

## 2020-12-16 DIAGNOSIS — R63.4: ICD-10-CM

## 2020-12-16 DIAGNOSIS — M79.89: ICD-10-CM

## 2020-12-16 DIAGNOSIS — N28.1: ICD-10-CM

## 2020-12-16 DIAGNOSIS — Z86.010: ICD-10-CM

## 2020-12-16 DIAGNOSIS — R59.0: ICD-10-CM

## 2020-12-16 LAB — ESTIMATED GFR-MDRD - POC: 49

## 2020-12-16 PROCEDURE — 82565 ASSAY OF CREATININE: CPT

## 2020-12-16 PROCEDURE — 74177 CT ABD & PELVIS W/CONTRAST: CPT

## 2020-12-16 NOTE — CT
CT OF THE ABDOMEN AND PELVIS WITH IV CONTRAST



INDICATION: History of weight loss of the last 3 months with constipation



COMPARISON: None



FINDINGS:





ABDOMEN:



Lung bases: There are numerous scattered pulmonary nodules involving the right middle lobe, visual ey
es lingula, left lower lobe and right lower lobe suspicious for pulmonary metastatic disease. The

largest is seen within the posterior medial aspect of the right lower lobe and is in a subpleural loc
ation on image 8 of series 2, measuring 2.6 cm in length. There are areas of subsegmental volume

loss involving both lower lobes. There are small bilateral pleural effusions.



Liver: No focal lesion.



Gallbladder:  Normal appearing.



Pancreas: There is an ill-defined irregular mass centered within the pancreatic body measuring 5.3 cm
 on image 33 of series 2. This is causing upstream dilatation of the main pancreatic duct. There

are small cystic lesions involving the pancreatic head and body suspicious for small pancreatic cyst 
or sidebranch IPMN lesions. There is soft tissue mass extending posterior from the mass lesion and

enveloping the proximal celiac artery and SMA arterial image 27 of series 4. There is also circumfere
ntial involvement of the proximal splenic artery seen on image 25 of series 4. There is an enlarged

peripancreatic lymph node seen adjacent to the pancreatic head on image 41 of series 2 measuring 10 m
m. There is a portal caval lymph node measuring 1.7 cm. There is inflammatory stranding surrounding

the pancreas. Small amount of edema seen within the anterior pararenal space. There is an enlarged ly
mph node seen adjacent to the aorta measuring 1.2 cm on image 35 of series 2.



Adrenal glands: Normal.



Spleen: Normal.



Kidneys and ureters: There are bilateral renal cysts. The largest cyst is seen within superior pole l
eft kidney measuring 1.7 cm. There is a 3.8 mm nonobstructing calculus within the inferior pole of

the left kidney. No hydronephrosis is evident.



Vasculature: There are moderate vascular calcifications seen involving the visualized vasculature.



Lymph nodes:There are multiple prominent periaortic lymph nodes. One of the largest is seen to the le
ft of the aorta on image 55 of series 2 measuring 1.4 cm.



Free fluid in abdomen:No free fluid is evident.





PELVIS:



Small and large bowel: There is colonic diverticulosis. The small bowel is of normal caliber.



Appendix:Surgically absent



Bladder: There is wall thickening with perivesicular fat stranding.



Rectal and perirectal soft tissues:Normal.



Reproductive structures: Normal.



Free fluid in pelvis: No free fluid is evident.



Lymphadenopathy pelvis: No lymphadenopathy is evident.



Osseous structures: No acute osseous abnormality.  No destructive osteolytic or osteoblastic lesion i
s identified.  There is scattered degenerative and osteoarthritic changes.



Soft tissues:Normal.



IMPRESSION:

1. Ill-defined irregular mildly hypodense mass centered within the proximal pancreatic body measuring
 approximately 5.3 cm with upstream dilatation the main pancreatic duct is suspicious for

pancreatic malignancy. There is soft tissue density projecting posterior from the mass lesion involvi
ng the proximal splenic artery, celiac artery and SMA suspicious for regional malignant spread.

There are enlarged lymph nodes within the peripancreatic, portacaval and retroperitoneal region suspi
cious for malignant lymphadenopathy. There are numerous pulmonary nodules within the lung bases

suspicious for pulmonary metastatic disease.

2. Recommend follow-up CT the abdomen with and without contrast utilizing pancreatic mass protocol to
 better delineate the lesion within the proximal pancreatic body.

3. Small cystic lesions within the pancreatic head and body may reflect small pancreatic serous type 
cysts or small IPMN lesions.

4. There is inflammatory stranding surrounding the pancreas with some mild edema in the surrounding r
etroperitoneum suspicious for component of acute pancreatitis.

5. Bilateral renal cysts and left nephrolithiasis.



Reported By: Parker Aguilera 

Electronically Signed:  12/16/2020 3:47 PM

## 2021-01-21 NOTE — CT
CT OF THE CHEST WITH IV CONTRAST



INDICATION: History of pancreatic cancer



COMPARISON: Prior CT the abdomen and pelvis with contrast dated December 16, 2020



FINDINGS:



CHEST:



Lungs: There is worsening pulmonary nodularity involving both lungs suspicious for worsening pulmonar
y metastatic disease. The largest pulmonary nodule within the right lower lobe now measures 3.2 x

1.8 cm on image 61 of series 3.



Pleural space:  There are small bilateral pleural effusions. There is subsegmental volume loss involv
ing both lower lobes.



Mediastinum: There are coronary artery and thoracic aortic calcifications. There is a left chest wall
 pacemaker.



Upper abdomen:The invasive soft tissue mass of the proximal pancreatic body appears slightly larger b
ut is difficult to fully measure due to the phase of contrast enhancement. There are numerous

peripancreatic and upper abdominal lymph node suspicious for metastatic lymphadenopathy. Bilateral re
nal cysts appear similar.



Osseous structures: No acute osseous abnormality.  No destructive osteolytic or osteoblastic lesion i
s identified.  There is scattered degenerative and osteoarthritic changes.



Soft tissues:Normal.



IMPRESSION:

1. Worsening pulmonary metastatic disease.

2. Small bilateral pleural effusions with bibasilar atelectasis

3. Suspected enlarging primary pancreatic mass lesion within the central abdomen with localized invas
ion of the retroperitoneum with numerous enlarged upper abdominal and peripancreatic lymph nodes.



Reported By: Parker Aguilera 

Electronically Signed:  1/21/2021 4:02 PM

## 2021-01-25 NOTE — EKG
Test Reason : PREOP

Blood Pressure : ***/*** mmHG

Vent. Rate : 062 BPM     Atrial Rate : 062 BPM

   P-R Int : 224 ms          QRS Dur : 092 ms

    QT Int : 450 ms       P-R-T Axes : 022 014 052 degrees

   QTc Int : 456 ms

 

Atrial-paced rhythm with prolonged AV conduction

Possible Anterior infarct , age undetermined

Abnormal ECG

No previous ECGs available

Confirmed by MAXIMILIAN FRY ([id number]) on 1/25/2021 10:50:08 AM

 

Referred By:  GODFREY           Confirmed By:MAXIMILIAN FRY

## 2021-01-27 NOTE — RAD
EXAM: 

CHEST ONE VIEW



HISTORY:

Post Mediport placement evaluate for pneumothorax.



COMPARISON:

11/6/2020 and CT thorax on the 1/21/2021



FINDINGS:

Dual lead left subclavian cardiac pacemaking device remains in place. A right subclavian Mediport cat
heter is now noted in place with tip overlying the proximal SVC. No pneumothorax is appreciated.

Bibasilar pleural and parenchymal changes are seen which were shown to represent bilateral pleural ef
fusions and associated atelectasis similar to prior CT exam. Several faint tiny prominent nodules

are seen within the lungs bilaterally shown to represent multiple metastatic pulmonary nodules on CT 
thorax. No other interval change.



IMPRESSION:



1. Interval placement of right-sided Mediport catheter without evidence of pneumothorax.

2. Bibasilar pleural and parenchymal lung changes also seen on CT thorax and shown to represent bilat
eral pleural effusions and associated areas of consolidation probably related to passive

atelectasis.

3. Multiple tiny pulmonary nodules are seen in the left lung and shown to represent multiple metastat
ic pulmonary nodules on prior CT exam.



Reported By: Sukh Downs 

Electronically Signed:  1/27/2021 11:33 AM

## 2021-01-27 NOTE — OP
DATE OF PROCEDURE:  01/27/2021



PREOPERATIVE DIAGNOSES:  

1. Pancreatic cancer.

2. Need of antineoplastic chemotherapy access.

3. Defibrillator in left chest.



POSTOPERATIVE DIAGNOSES:  

1. Pancreatic cancer.

2. Need of antineoplastic chemotherapy access.

3. Defibrillator in left chest.



PROCEDURE PERFORMED:  Right subclavian vein low-profile PowerPort, MediPort.



ANESTHESIA:  Intravenous sedation and local with 0.5% Marcaine 30 mL, mixed with 1%

Xylocaine with epinephrine 20 mL. 



DESCRIPTION OF PROCEDURE:  The patient was taken to the operating room where under

intravenous sedation, neck and chest were clipped of hair, prepared with ChloraPrep,

and draped in routine fashion.  Local anesthetic infiltrated in the skin and

subcutaneous tissue about the operative site.  Infraclavicular approach to access

the right subclavian vein, threading the J-wire, removing the trocar catheter and

skin site enlarged sharply and subcutaneous pocket created with blunt and sharp

dissection, gaining hemostasis with cautery.  Dilator and Peel-Away sheath were

placed over the J-wire into superior vena cava and dilator and J-wire were removed.

Catheter was placed over the Peel-Away sheath.  Peel-Away sheath removed.

Fluoroscopic images used to place the catheter tip in optimal position in the SVC

and catheter tailored to length, connected to the MediPort, placed in subcutaneous

pocket, secured with 2 interrupted sutures of 3-0 Prolene.  Subcutaneous tissue was

approximated with 3-0 Monocryl, skin with subdermal 4-0 Monocryl, and Derma glue

applied.  The Ayala needle access the MediPort, aspirated blood, flushed with

heparinized saline solution and sterile dressing was applied.  Final fluoroscopic

images revealed good line placement. 







Job ID:  [id number]